# Patient Record
Sex: FEMALE | Race: WHITE | NOT HISPANIC OR LATINO
[De-identification: names, ages, dates, MRNs, and addresses within clinical notes are randomized per-mention and may not be internally consistent; named-entity substitution may affect disease eponyms.]

---

## 2017-10-20 ENCOUNTER — APPOINTMENT (OUTPATIENT)
Dept: SURGICAL ONCOLOGY | Facility: CLINIC | Age: 62
End: 2017-10-20

## 2018-08-07 ENCOUNTER — RESULT REVIEW (OUTPATIENT)
Age: 63
End: 2018-08-07

## 2019-02-14 ENCOUNTER — RESULT REVIEW (OUTPATIENT)
Age: 64
End: 2019-02-14

## 2020-02-13 ENCOUNTER — RESULT REVIEW (OUTPATIENT)
Age: 65
End: 2020-02-13

## 2021-12-10 ENCOUNTER — RESULT REVIEW (OUTPATIENT)
Age: 66
End: 2021-12-10

## 2022-08-18 ENCOUNTER — RESULT REVIEW (OUTPATIENT)
Age: 67
End: 2022-08-18

## 2022-09-19 ENCOUNTER — APPOINTMENT (OUTPATIENT)
Dept: GYNECOLOGIC ONCOLOGY | Facility: CLINIC | Age: 67
End: 2022-09-19

## 2022-09-19 VITALS
DIASTOLIC BLOOD PRESSURE: 96 MMHG | HEART RATE: 66 BPM | HEIGHT: 66 IN | BODY MASS INDEX: 26.2 KG/M2 | SYSTOLIC BLOOD PRESSURE: 201 MMHG | WEIGHT: 163 LBS

## 2022-09-19 DIAGNOSIS — D17.23 BENIGN LIPOMATOUS NEOPLASM OF SKIN AND SUBCUTANEOUS TISSUE OF RIGHT LEG: ICD-10-CM

## 2022-09-19 DIAGNOSIS — I10 ESSENTIAL (PRIMARY) HYPERTENSION: ICD-10-CM

## 2022-09-19 DIAGNOSIS — R93.89 ABNORMAL FINDINGS ON DIAGNOSTIC IMAGING OF OTHER SPECIFIED BODY STRUCTURES: ICD-10-CM

## 2022-09-19 DIAGNOSIS — E78.5 HYPERLIPIDEMIA, UNSPECIFIED: ICD-10-CM

## 2022-09-19 PROCEDURE — 99205 OFFICE O/P NEW HI 60 MIN: CPT

## 2022-09-19 RX ORDER — CHROMIUM 200 MCG
TABLET ORAL
Refills: 0 | Status: ACTIVE | COMMUNITY

## 2022-09-19 RX ORDER — METOPROLOL SUCCINATE 25 MG/1
25 TABLET, EXTENDED RELEASE ORAL
Refills: 0 | Status: ACTIVE | COMMUNITY

## 2022-09-19 RX ORDER — MULTIVITAMIN
TABLET ORAL
Refills: 0 | Status: ACTIVE | COMMUNITY

## 2022-09-19 RX ORDER — SIMVASTATIN 10 MG/1
10 TABLET, FILM COATED ORAL
Refills: 0 | Status: ACTIVE | COMMUNITY

## 2022-09-19 RX ORDER — AMLODIPINE BESYLATE 5 MG/1
TABLET ORAL
Refills: 0 | Status: ACTIVE | COMMUNITY

## 2022-09-27 ENCOUNTER — APPOINTMENT (OUTPATIENT)
Dept: CT IMAGING | Facility: CLINIC | Age: 67
End: 2022-09-27

## 2022-09-27 ENCOUNTER — OUTPATIENT (OUTPATIENT)
Dept: OUTPATIENT SERVICES | Facility: HOSPITAL | Age: 67
LOS: 1 days | End: 2022-09-27
Payer: COMMERCIAL

## 2022-09-27 DIAGNOSIS — Z00.8 ENCOUNTER FOR OTHER GENERAL EXAMINATION: ICD-10-CM

## 2022-09-27 PROCEDURE — 74177 CT ABD & PELVIS W/CONTRAST: CPT

## 2022-09-27 PROCEDURE — 74177 CT ABD & PELVIS W/CONTRAST: CPT | Mod: 26

## 2022-09-29 ENCOUNTER — NON-APPOINTMENT (OUTPATIENT)
Age: 67
End: 2022-09-29

## 2022-10-01 PROBLEM — E78.5 HYPERLIPIDEMIA: Status: ACTIVE | Noted: 2022-09-12

## 2022-10-01 PROBLEM — I10 HYPERTENSION: Status: ACTIVE | Noted: 2022-09-12

## 2022-10-24 ENCOUNTER — APPOINTMENT (OUTPATIENT)
Dept: GYNECOLOGIC ONCOLOGY | Facility: CLINIC | Age: 67
End: 2022-10-24

## 2022-10-24 DIAGNOSIS — R14.0 ABDOMINAL DISTENSION (GASEOUS): ICD-10-CM

## 2022-10-24 DIAGNOSIS — D21.9 BENIGN NEOPLASM OF CONNECTIVE AND OTHER SOFT TISSUE, UNSPECIFIED: ICD-10-CM

## 2022-10-24 DIAGNOSIS — R10.2 PELVIC AND PERINEAL PAIN: ICD-10-CM

## 2022-10-24 DIAGNOSIS — N95.0 POSTMENOPAUSAL BLEEDING: ICD-10-CM

## 2022-10-24 PROBLEM — D17.23 LIPOMA OF RIGHT LOWER EXTREMITY: Status: ACTIVE | Noted: 2022-09-19

## 2022-10-24 PROBLEM — R93.89 THICKENED ENDOMETRIUM: Status: ACTIVE | Noted: 2022-09-12

## 2022-10-24 PROCEDURE — 99213 OFFICE O/P EST LOW 20 MIN: CPT

## 2022-12-27 ENCOUNTER — NON-APPOINTMENT (OUTPATIENT)
Age: 67
End: 2022-12-27

## 2022-12-29 ENCOUNTER — NON-APPOINTMENT (OUTPATIENT)
Age: 67
End: 2022-12-29

## 2023-01-03 ENCOUNTER — OFFICE (OUTPATIENT)
Dept: URBAN - METROPOLITAN AREA CLINIC 109 | Facility: CLINIC | Age: 68
Setting detail: OPHTHALMOLOGY
End: 2023-01-03
Payer: COMMERCIAL

## 2023-01-03 DIAGNOSIS — H02.831: ICD-10-CM

## 2023-01-03 DIAGNOSIS — H02.832: ICD-10-CM

## 2023-01-03 DIAGNOSIS — H40.033: ICD-10-CM

## 2023-01-03 DIAGNOSIS — H02.835: ICD-10-CM

## 2023-01-03 DIAGNOSIS — H43.391: ICD-10-CM

## 2023-01-03 DIAGNOSIS — H02.834: ICD-10-CM

## 2023-01-03 DIAGNOSIS — H25.13: ICD-10-CM

## 2023-01-03 DIAGNOSIS — H16.223: ICD-10-CM

## 2023-01-03 PROBLEM — J01.90 ACUTE SINUSITIS: Status: RESOLVED | Noted: 2023-01-03 | Resolved: 2023-01-03

## 2023-01-03 PROCEDURE — 92201 OPSCPY EXTND RTA DRAW UNI/BI: CPT | Performed by: OPHTHALMOLOGY

## 2023-01-03 PROCEDURE — 92014 COMPRE OPH EXAM EST PT 1/>: CPT | Performed by: OPHTHALMOLOGY

## 2023-01-03 ASSESSMENT — LID POSITION - DERMATOCHALASIS
OS_DERMATOCHALASIS: LLL LUL 1+
OD_DERMATOCHALASIS: RLL RUL 1+

## 2023-01-03 ASSESSMENT — TONOMETRY
OD_IOP_MMHG: 19
OS_IOP_MMHG: 18

## 2023-01-03 ASSESSMENT — CONFRONTATIONAL VISUAL FIELD TEST (CVF)
OS_FINDINGS: FULL
OD_FINDINGS: FULL

## 2023-01-03 ASSESSMENT — LID EXAM ASSESSMENTS: OS_EDEMA: ABSENT

## 2023-01-05 ASSESSMENT — REFRACTION_CURRENTRX
OD_AXIS: 103
OD_ADD: +2.00
OD_CYLINDER: -1.50
OS_SPHERE: +0.75
OD_OVR_VA: 20/
OS_ADD: +2.00
OS_AXIS: 94
OS_VPRISM_DIRECTION: BF
OS_CYLINDER: -1.25
OS_OVR_VA: 20/
OD_VPRISM_DIRECTION: BF
OD_SPHERE: +1.00

## 2023-01-05 ASSESSMENT — KERATOMETRY
OS_AXISANGLE_DEGREES: 110
OS_K2POWER_DIOPTERS: 46.62
OS_K1POWER_DIOPTERS: 46.37
OD_K2POWER_DIOPTERS: 45.25
OD_AXISANGLE_DEGREES: 22
OD_K1POWER_DIOPTERS: 45.00

## 2023-01-05 ASSESSMENT — AXIALLENGTH_DERIVED
OS_AL: 22.28
OD_AL: 22.8716
OS_AL: 22.36
OD_AL: 22.7804

## 2023-01-05 ASSESSMENT — REFRACTION_MANIFEST
OD_SPHERE: +1.00
OS_SPHERE: +1.25
OD_VA1: 20/30+
OS_CYLINDER: -1.50
OS_VA1: 20/30+
OD_CYLINDER: -1.25
OS_ADD: +2.50
OS_AXIS: 90
OD_ADD: +2.50
OD_AXIS: 100

## 2023-01-05 ASSESSMENT — SPHEQUIV_DERIVED
OD_SPHEQUIV: 0.625
OS_SPHEQUIV: 0.5
OD_SPHEQUIV: 0.375
OS_SPHEQUIV: 0.75

## 2023-01-05 ASSESSMENT — VISUAL ACUITY
OS_BCVA: 20/30-2
OD_BCVA: 20/30-2

## 2023-01-05 ASSESSMENT — REFRACTION_AUTOREFRACTION
OD_CYLINDER: -1.75
OS_AXIS: 073
OD_AXIS: 093
OS_CYLINDER: -1.00
OS_SPHERE: +1.25
OD_SPHERE: +1.50

## 2023-02-01 ENCOUNTER — OFFICE (OUTPATIENT)
Dept: URBAN - METROPOLITAN AREA CLINIC 109 | Facility: CLINIC | Age: 68
Setting detail: OPHTHALMOLOGY
End: 2023-02-01
Payer: COMMERCIAL

## 2023-02-01 DIAGNOSIS — H43.391: ICD-10-CM

## 2023-02-01 DIAGNOSIS — H02.835: ICD-10-CM

## 2023-02-01 DIAGNOSIS — H40.033: ICD-10-CM

## 2023-02-01 DIAGNOSIS — H02.834: ICD-10-CM

## 2023-02-01 DIAGNOSIS — H02.831: ICD-10-CM

## 2023-02-01 DIAGNOSIS — H16.223: ICD-10-CM

## 2023-02-01 DIAGNOSIS — H02.832: ICD-10-CM

## 2023-02-01 DIAGNOSIS — H25.13: ICD-10-CM

## 2023-02-01 PROCEDURE — 92014 COMPRE OPH EXAM EST PT 1/>: CPT | Performed by: OPHTHALMOLOGY

## 2023-02-01 PROCEDURE — 92020 GONIOSCOPY: CPT | Performed by: OPHTHALMOLOGY

## 2023-02-01 ASSESSMENT — SPHEQUIV_DERIVED
OS_SPHEQUIV: 0.5
OD_SPHEQUIV: 0.625
OD_SPHEQUIV: 0.375
OS_SPHEQUIV: 0.75

## 2023-02-01 ASSESSMENT — REFRACTION_MANIFEST
OS_ADD: +2.50
OS_SPHERE: +1.25
OS_VA1: 20/30+
OS_CYLINDER: -1.50
OD_AXIS: 100
OD_CYLINDER: -1.25
OS_AXIS: 90
OD_VA1: 20/30+
OD_ADD: +2.50
OD_SPHERE: +1.00

## 2023-02-01 ASSESSMENT — REFRACTION_AUTOREFRACTION
OS_AXIS: 073
OS_SPHERE: +1.25
OD_SPHERE: +1.50
OS_CYLINDER: -1.00
OD_AXIS: 093
OD_CYLINDER: -1.75

## 2023-02-01 ASSESSMENT — VISUAL ACUITY
OS_BCVA: 20/30-2
OD_BCVA: 20/25-2

## 2023-02-01 ASSESSMENT — CONFRONTATIONAL VISUAL FIELD TEST (CVF)
OS_FINDINGS: FULL
OD_FINDINGS: FULL

## 2023-02-01 ASSESSMENT — REFRACTION_CURRENTRX
OD_ADD: +2.00
OS_OVR_VA: 20/
OD_OVR_VA: 20/
OD_SPHERE: +1.00
OD_CYLINDER: -1.50
OS_AXIS: 94
OS_VPRISM_DIRECTION: BF
OS_ADD: +2.00
OS_CYLINDER: -1.25
OS_SPHERE: +0.75
OD_VPRISM_DIRECTION: BF
OD_AXIS: 103

## 2023-02-01 ASSESSMENT — TONOMETRY
OS_IOP_MMHG: 16
OD_IOP_MMHG: 17

## 2023-02-01 ASSESSMENT — KERATOMETRY
OD_AXISANGLE_DEGREES: 22
OD_K2POWER_DIOPTERS: 45.25
OS_K1POWER_DIOPTERS: 46.37
OD_K1POWER_DIOPTERS: 45.00
OS_K2POWER_DIOPTERS: 46.62
OS_AXISANGLE_DEGREES: 110

## 2023-02-01 ASSESSMENT — AXIALLENGTH_DERIVED
OS_AL: 22.28
OD_AL: 22.8716
OS_AL: 22.36
OD_AL: 22.7804

## 2023-02-01 ASSESSMENT — LID EXAM ASSESSMENTS: OS_EDEMA: ABSENT

## 2023-02-01 ASSESSMENT — LID POSITION - DERMATOCHALASIS
OS_DERMATOCHALASIS: LLL LUL 1+
OD_DERMATOCHALASIS: RLL RUL 1+

## 2023-07-13 ENCOUNTER — OFFICE (OUTPATIENT)
Dept: URBAN - METROPOLITAN AREA CLINIC 109 | Facility: CLINIC | Age: 68
Setting detail: OPHTHALMOLOGY
End: 2023-07-13
Payer: MEDICARE

## 2023-07-13 DIAGNOSIS — L23.9: ICD-10-CM

## 2023-07-13 PROCEDURE — 99213 OFFICE O/P EST LOW 20 MIN: CPT | Performed by: OPHTHALMOLOGY

## 2023-07-13 ASSESSMENT — REFRACTION_CURRENTRX
OD_AXIS: 103
OS_CYLINDER: -1.25
OD_CYLINDER: -1.50
OS_AXIS: 94
OD_OVR_VA: 20/
OS_ADD: +2.00
OS_VPRISM_DIRECTION: BF
OS_OVR_VA: 20/
OS_SPHERE: +0.75
OD_ADD: +2.00
OD_SPHERE: +1.00
OD_VPRISM_DIRECTION: BF

## 2023-07-13 ASSESSMENT — AXIALLENGTH_DERIVED
OD_AL: 22.7804
OS_AL: 22.28
OD_AL: 22.8716
OS_AL: 22.36

## 2023-07-13 ASSESSMENT — KERATOMETRY
OS_K1POWER_DIOPTERS: 46.37
OD_K2POWER_DIOPTERS: 45.25
OD_AXISANGLE_DEGREES: 22
OD_K1POWER_DIOPTERS: 45.00
OS_AXISANGLE_DEGREES: 110
OS_K2POWER_DIOPTERS: 46.62

## 2023-07-13 ASSESSMENT — REFRACTION_MANIFEST
OD_CYLINDER: -1.25
OD_AXIS: 100
OS_ADD: +2.50
OS_SPHERE: +1.25
OS_AXIS: 90
OD_SPHERE: +1.00
OD_VA1: 20/30+
OS_VA1: 20/30+
OD_ADD: +2.50
OS_CYLINDER: -1.50

## 2023-07-13 ASSESSMENT — LID EXAM ASSESSMENTS: OS_EDEMA: ABSENT

## 2023-07-13 ASSESSMENT — REFRACTION_AUTOREFRACTION
OS_AXIS: 073
OD_CYLINDER: -1.75
OS_CYLINDER: -1.00
OD_AXIS: 093
OS_SPHERE: +1.25
OD_SPHERE: +1.50

## 2023-07-13 ASSESSMENT — VISUAL ACUITY
OS_BCVA: 20/25+2
OD_BCVA: 20/20-2

## 2023-07-13 ASSESSMENT — SPHEQUIV_DERIVED
OS_SPHEQUIV: 0.75
OD_SPHEQUIV: 0.375
OD_SPHEQUIV: 0.625
OS_SPHEQUIV: 0.5

## 2023-07-13 ASSESSMENT — TONOMETRY
OD_IOP_MMHG: 18
OS_IOP_MMHG: 18

## 2023-07-13 ASSESSMENT — LID POSITION - DERMATOCHALASIS
OD_DERMATOCHALASIS: RLL RUL 1+
OS_DERMATOCHALASIS: LLL LUL 1+

## 2023-07-13 ASSESSMENT — CONFRONTATIONAL VISUAL FIELD TEST (CVF)
OS_FINDINGS: FULL
OD_FINDINGS: FULL

## 2024-03-28 ENCOUNTER — APPOINTMENT (OUTPATIENT)
Dept: ORTHOPEDIC SURGERY | Facility: CLINIC | Age: 69
End: 2024-03-28
Payer: MEDICARE

## 2024-03-28 DIAGNOSIS — M25.511 PAIN IN RIGHT SHOULDER: ICD-10-CM

## 2024-03-28 PROCEDURE — 73030 X-RAY EXAM OF SHOULDER: CPT | Mod: RT

## 2024-03-28 PROCEDURE — 99203 OFFICE O/P NEW LOW 30 MIN: CPT

## 2024-03-29 PROBLEM — M25.511 RIGHT SHOULDER PAIN, UNSPECIFIED CHRONICITY: Status: ACTIVE | Noted: 2024-03-29

## 2024-03-29 NOTE — HISTORY OF PRESENT ILLNESS
[de-identified] : 68 year old RHD female presents today with acute on chronic right shoulder pain x 2 month. Patient injury her shoulder playing pickle ball x 2 years. She reached to hit the ball felt a sharp pain in her shoulder. She saw orthopedist at that time and diagnosed with impingement. She completed PT after the injury and was doing well until recently.  She had sudden onset of pain 2 months ago and has been getting worse. The pain is constant worse with internal rotation. C/o weakness. Takes Tylenol.   The patient's past medical history, past surgical history, medications and allergies were reviewed by me today with the patient and documented accordingly. In addition, the patient's family and social history, which were noncontributory to this visit, were reviewed also.

## 2024-03-29 NOTE — PHYSICAL EXAM
[de-identified] : Right shoulder exam:   Inspection: No malalignment, No defects, No atrophy Skin: No masses, No lesions Neck: Negative Spurling, full ROM, no pain with ROM AROM: FF to 160, abduction to 90, ER to 60, IR to mid lumbar Painful arc ROM: + Tenderness: No bicipital tenderness, +tenderness to greater tuberosity/RTC insertion, + anterior shoulder/lesser tuberosity tenderness Strength: 5/5 ER, 5/5 IR in adduction, 4/5 supraspinatus testing, +Rockaway's test AC joint: No TTP/pain with cross arm testing Biceps: Speed Negative, Yergason Negative Impingement test: + Bullard, + Neer Vasc: 2+ radial pulse Stability: Stable Neuro: AIN, PIN, Ulnar nerve intact to motor Sensation: Intact to light touch throughout Other findings: None.  [de-identified] : The following radiographs were ordered and read by me during this patients visit. I reviewed each radiograph in detail with the patient and discussed the findings as highlighted below.   3 views of right shoulder were obtained today, 03/28/2024, that show no acute fracture or dislocation. There is no glenohumeral and no AC joint degenerative change seen. Type II acromion. There is no significant malalignment. No significant other obvious osseous abnormality, otherwise unremarkable.

## 2024-03-29 NOTE — DISCUSSION/SUMMARY
[de-identified] : 67 y/o female with right shoulder pain.   A discussion was had with the patient regarding potential sources of inflammatory shoulder discomfort; including rotator cuff tendon dysfunction (including tendonitis vs. internal structural damage), subdeltoid/subacromial bursitis, or impingement of the rotator cuff at the acromion. Other contributing sources of pain may be the acromioclavicular joint or long head of the biceps tendon. Irritation is typically caused either by overhead repetitive activity or as a result of minor injury.   Discussed short-term and long-term outcomes as well as the goal of treatment to reduce pain and restore function. Nonsurgical treatment is typically first-line therapy that may take weeks to months to resolve symptoms; includes rest from overhead activities, NSAIDs, home exercise program versus physical therapy to restore normal strength/ROM/function of the shoulder, and possible corticosteroid injection. Also discussed the role of arthroscopic surgical intervention when nonsurgical treatment does not adequately relieve pain/inflammation.   MRI Rx given to patient to further delineate any internal structural derangement to the shoulder. This will allow for better understanding of the severity of disease, and allow for better stratification of treatment strategies (surgical vs. non-surgical). Patient is agreeable to further imaging.   Followup: After MRI

## 2024-03-29 NOTE — ADDENDUM
[FreeTextEntry1] : This note was written by Mariana Singh on 03/28/2024 acting solely as a scribe for Dr. Fernando Vasquez.  All medical record entries made by the Scribe were at my, Dr. Fernando Vasquez, direction and personally dictated by me on 03/28/2024. I have personally reviewed the chart and agree that the record accurately reflects my personal performance of the history, physical exam, assessment and plan.

## 2024-05-20 ENCOUNTER — OFFICE (OUTPATIENT)
Dept: URBAN - METROPOLITAN AREA CLINIC 109 | Facility: CLINIC | Age: 69
Setting detail: OPHTHALMOLOGY
End: 2024-05-20
Payer: MEDICARE

## 2024-05-20 DIAGNOSIS — H01.001: ICD-10-CM

## 2024-05-20 DIAGNOSIS — H01.005: ICD-10-CM

## 2024-05-20 DIAGNOSIS — H02.89: ICD-10-CM

## 2024-05-20 DIAGNOSIS — H01.002: ICD-10-CM

## 2024-05-20 DIAGNOSIS — H01.004: ICD-10-CM

## 2024-05-20 PROCEDURE — 99213 OFFICE O/P EST LOW 20 MIN: CPT | Performed by: OPHTHALMOLOGY

## 2024-05-20 ASSESSMENT — CONFRONTATIONAL VISUAL FIELD TEST (CVF)
OS_FINDINGS: FULL
OD_FINDINGS: FULL

## 2024-05-20 ASSESSMENT — LID EXAM ASSESSMENTS
OS_BLEPHARITIS: LLL LUL 1+
OS_EDEMA: ABSENT
OS_MEIBOMITIS: LLL LUL 1+
OD_MEIBOMITIS: RLL RUL 1+
OD_BLEPHARITIS: RLL RUL 1+

## 2024-05-20 ASSESSMENT — LID POSITION - DERMATOCHALASIS
OS_DERMATOCHALASIS: LLL LUL 1+
OD_DERMATOCHALASIS: RLL RUL 1+

## 2024-05-30 ENCOUNTER — NON-APPOINTMENT (OUTPATIENT)
Age: 69
End: 2024-05-30

## 2024-06-24 ENCOUNTER — APPOINTMENT (OUTPATIENT)
Dept: ENDOCRINOLOGY | Facility: CLINIC | Age: 69
End: 2024-06-24
Payer: MEDICARE

## 2024-06-24 DIAGNOSIS — Z87.891 PERSONAL HISTORY OF NICOTINE DEPENDENCE: ICD-10-CM

## 2024-06-24 DIAGNOSIS — M81.0 AGE-RELATED OSTEOPOROSIS W/OUT CURRENT PATHOLOGICAL FRACTURE: ICD-10-CM

## 2024-06-24 PROCEDURE — G2211 COMPLEX E/M VISIT ADD ON: CPT

## 2024-06-24 PROCEDURE — 99205 OFFICE O/P NEW HI 60 MIN: CPT

## 2024-06-24 RX ORDER — HYDROCHLOROTHIAZIDE 12.5 MG/1
TABLET ORAL
Refills: 0 | Status: ACTIVE | COMMUNITY

## 2024-06-24 RX ORDER — MAGNESIUM 100 MG
CAPSULE ORAL
Refills: 0 | Status: ACTIVE | COMMUNITY

## 2024-06-24 RX ORDER — RISEDRONATE SODIUM 150 MG/1
150 TABLET, FILM COATED ORAL
Qty: 3 | Refills: 3 | Status: ACTIVE | COMMUNITY
Start: 2024-06-24 | End: 1900-01-01

## 2024-07-01 ENCOUNTER — NON-APPOINTMENT (OUTPATIENT)
Age: 69
End: 2024-07-01

## 2024-07-02 RX ORDER — DICLOFENAC SODIUM 3 G/100G
3 GEL TOPICAL
Qty: 100 | Refills: 0 | Status: ACTIVE | COMMUNITY
Start: 2024-07-02 | End: 1900-01-01

## 2024-10-28 ENCOUNTER — APPOINTMENT (OUTPATIENT)
Dept: ENDOCRINOLOGY | Facility: CLINIC | Age: 69
End: 2024-10-28
Payer: MEDICARE

## 2024-10-28 VITALS
WEIGHT: 165 LBS | DIASTOLIC BLOOD PRESSURE: 92 MMHG | OXYGEN SATURATION: 98 % | SYSTOLIC BLOOD PRESSURE: 140 MMHG | HEART RATE: 69 BPM | HEIGHT: 66 IN | BODY MASS INDEX: 26.52 KG/M2

## 2024-10-28 DIAGNOSIS — E55.9 VITAMIN D DEFICIENCY, UNSPECIFIED: ICD-10-CM

## 2024-10-28 DIAGNOSIS — M81.0 AGE-RELATED OSTEOPOROSIS W/OUT CURRENT PATHOLOGICAL FRACTURE: ICD-10-CM

## 2024-10-28 PROCEDURE — G2211 COMPLEX E/M VISIT ADD ON: CPT

## 2024-10-28 PROCEDURE — 99214 OFFICE O/P EST MOD 30 MIN: CPT

## 2024-10-29 LAB
ALBUMIN SERPL ELPH-MCNC: 4.6 G/DL
ALP BLD-CCNC: 75 U/L
ALT SERPL-CCNC: 12 U/L
ANION GAP SERPL CALC-SCNC: 15 MMOL/L
AST SERPL-CCNC: 20 U/L
BILIRUB SERPL-MCNC: 0.5 MG/DL
BUN SERPL-MCNC: 15 MG/DL
CALCIUM SERPL-MCNC: 10 MG/DL
CALCIUM SERPL-MCNC: 10 MG/DL
CHLORIDE SERPL-SCNC: 100 MMOL/L
CO2 SERPL-SCNC: 25 MMOL/L
CREAT SERPL-MCNC: 0.6 MG/DL
EGFR: 96 ML/MIN/1.73M2
GLUCOSE SERPL-MCNC: 97 MG/DL
PARATHYROID HORMONE INTACT: 43 PG/ML
PHOSPHATE SERPL-MCNC: 3 MG/DL
POTASSIUM SERPL-SCNC: 3.8 MMOL/L
PROT SERPL-MCNC: 7.6 G/DL
SODIUM SERPL-SCNC: 140 MMOL/L

## 2024-11-04 LAB — COLLAGEN CTX SERPL-MCNC: 311 PG/ML

## 2025-02-10 ENCOUNTER — APPOINTMENT (OUTPATIENT)
Dept: ENDOCRINOLOGY | Facility: CLINIC | Age: 70
End: 2025-02-10
Payer: MEDICARE

## 2025-02-10 DIAGNOSIS — M81.0 AGE-RELATED OSTEOPOROSIS W/OUT CURRENT PATHOLOGICAL FRACTURE: ICD-10-CM

## 2025-02-10 DIAGNOSIS — E55.9 VITAMIN D DEFICIENCY, UNSPECIFIED: ICD-10-CM

## 2025-02-10 PROCEDURE — G2211 COMPLEX E/M VISIT ADD ON: CPT | Mod: 2W

## 2025-02-10 PROCEDURE — 99214 OFFICE O/P EST MOD 30 MIN: CPT | Mod: 2W

## 2025-04-28 ENCOUNTER — OFFICE (OUTPATIENT)
Dept: URBAN - METROPOLITAN AREA CLINIC 109 | Facility: CLINIC | Age: 70
Setting detail: OPHTHALMOLOGY
End: 2025-04-28
Payer: MEDICARE

## 2025-04-28 DIAGNOSIS — H02.832: ICD-10-CM

## 2025-04-28 DIAGNOSIS — H01.001: ICD-10-CM

## 2025-04-28 DIAGNOSIS — H43.391: ICD-10-CM

## 2025-04-28 DIAGNOSIS — H02.831: ICD-10-CM

## 2025-04-28 DIAGNOSIS — H25.13: ICD-10-CM

## 2025-04-28 DIAGNOSIS — H02.89: ICD-10-CM

## 2025-04-28 DIAGNOSIS — H01.111: ICD-10-CM

## 2025-04-28 DIAGNOSIS — H01.114: ICD-10-CM

## 2025-04-28 DIAGNOSIS — H01.005: ICD-10-CM

## 2025-04-28 DIAGNOSIS — H01.004: ICD-10-CM

## 2025-04-28 DIAGNOSIS — H40.033: ICD-10-CM

## 2025-04-28 DIAGNOSIS — H01.002: ICD-10-CM

## 2025-04-28 PROCEDURE — 92014 COMPRE OPH EXAM EST PT 1/>: CPT | Performed by: OPHTHALMOLOGY

## 2025-04-28 PROCEDURE — 92020 GONIOSCOPY: CPT | Performed by: OPHTHALMOLOGY

## 2025-04-28 ASSESSMENT — REFRACTION_AUTOREFRACTION
OS_SPHERE: +1.25
OD_AXIS: 093
OS_AXIS: 080
OS_CYLINDER: -1.50
OD_SPHERE: +1.50
OD_CYLINDER: -2.25

## 2025-04-28 ASSESSMENT — LID POSITION - DERMATOCHALASIS
OD_DERMATOCHALASIS: RLL RUL 1+
OS_DERMATOCHALASIS: LLL LUL 1+

## 2025-04-28 ASSESSMENT — REFRACTION_MANIFEST
OD_ADD: +2.50
OD_SPHERE: +1.00
OD_VA1: 20/30+
OS_ADD: +2.50
OS_VA1: 20/30+
OS_SPHERE: +1.25
OS_AXIS: 90
OD_AXIS: 100
OD_CYLINDER: -1.25
OS_CYLINDER: -1.50

## 2025-04-28 ASSESSMENT — REFRACTION_CURRENTRX
OS_VPRISM_DIRECTION: BF
OD_OVR_VA: 20/
OS_VPRISM_DIRECTION: SV
OD_VPRISM_DIRECTION: SV
OD_AXIS: 098
OS_SPHERE: +3.00
OD_ADD: +2.25
OD_SPHERE: +3.00
OD_AXIS: 096
OS_AXIS: 079
OS_OVR_VA: 20/
OS_AXIS: 076
OS_OVR_VA: 20/
OS_CYLINDER: -1.50
OD_VPRISM_DIRECTION: BF
OD_OVR_VA: 20/
OS_CYLINDER: -1.25
OS_SPHERE: +1.00
OD_CYLINDER: -1.50
OS_ADD: +2.25
OD_CYLINDER: -1.50
OD_SPHERE: +1.00

## 2025-04-28 ASSESSMENT — LID EXAM ASSESSMENTS
OS_MEIBOMITIS: LLL LUL 1+
OS_BLEPHARITIS: LLL LUL 1+
OD_MEIBOMITIS: RLL RUL 1+
OS_EDEMA: ABSENT
OD_BLEPHARITIS: RLL RUL 1+

## 2025-04-28 ASSESSMENT — KERATOMETRY
OD_K1POWER_DIOPTERS: 45.50
OS_K2POWER_DIOPTERS: 47.00
OS_AXISANGLE_DEGREES: 171
OS_K1POWER_DIOPTERS: 46.75
OD_K2POWER_DIOPTERS: 46.75
OD_AXISANGLE_DEGREES: 019

## 2025-04-28 ASSESSMENT — VISUAL ACUITY
OS_BCVA: 20/40-2
OD_BCVA: 20/30-1

## 2025-04-28 ASSESSMENT — CONFRONTATIONAL VISUAL FIELD TEST (CVF)
OS_FINDINGS: FULL
OD_FINDINGS: FULL

## 2025-04-28 ASSESSMENT — TONOMETRY
OD_IOP_MMHG: 16
OS_IOP_MMHG: 17

## 2025-06-11 ENCOUNTER — RX RENEWAL (OUTPATIENT)
Age: 70
End: 2025-06-11